# Patient Record
Sex: FEMALE | Race: WHITE | Employment: FULL TIME | URBAN - METROPOLITAN AREA
[De-identification: names, ages, dates, MRNs, and addresses within clinical notes are randomized per-mention and may not be internally consistent; named-entity substitution may affect disease eponyms.]

---

## 2017-02-17 ENCOUNTER — OFFICE VISIT (OUTPATIENT)
Dept: FAMILY MEDICINE CLINIC | Facility: CLINIC | Age: 37
End: 2017-02-17

## 2017-02-17 VITALS
OXYGEN SATURATION: 99 % | HEART RATE: 100 BPM | DIASTOLIC BLOOD PRESSURE: 82 MMHG | SYSTOLIC BLOOD PRESSURE: 136 MMHG | TEMPERATURE: 100 F | BODY MASS INDEX: 40 KG/M2 | WEIGHT: 235 LBS | RESPIRATION RATE: 16 BRPM

## 2017-02-17 DIAGNOSIS — L40.9 PSORIASIS: ICD-10-CM

## 2017-02-17 DIAGNOSIS — R11.0 NAUSEA: ICD-10-CM

## 2017-02-17 DIAGNOSIS — J32.0 LEFT MAXILLARY SINUSITIS: Primary | ICD-10-CM

## 2017-02-17 DIAGNOSIS — Z98.811 DENTAL CROWN PRESENT: ICD-10-CM

## 2017-02-17 DIAGNOSIS — Z20.818 STREP THROAT EXPOSURE: ICD-10-CM

## 2017-02-17 PROCEDURE — 87081 CULTURE SCREEN ONLY: CPT | Performed by: FAMILY MEDICINE

## 2017-02-17 PROCEDURE — 99214 OFFICE O/P EST MOD 30 MIN: CPT | Performed by: FAMILY MEDICINE

## 2017-02-17 PROCEDURE — 87880 STREP A ASSAY W/OPTIC: CPT | Performed by: FAMILY MEDICINE

## 2017-02-17 PROCEDURE — 87147 CULTURE TYPE IMMUNOLOGIC: CPT | Performed by: FAMILY MEDICINE

## 2017-02-17 RX ORDER — ONDANSETRON 8 MG/1
8 TABLET, ORALLY DISINTEGRATING ORAL EVERY 8 HOURS PRN
Qty: 10 TABLET | Refills: 0 | Status: SHIPPED | OUTPATIENT
Start: 2017-02-17 | End: 2017-02-24

## 2017-02-17 RX ORDER — AMOXICILLIN AND CLAVULANATE POTASSIUM 875; 125 MG/1; MG/1
1 TABLET, FILM COATED ORAL 2 TIMES DAILY
Qty: 20 TABLET | Refills: 0 | Status: SHIPPED | OUTPATIENT
Start: 2017-02-17 | End: 2017-02-27

## 2017-02-17 NOTE — PATIENT INSTRUCTIONS
Sinusitis (Antibiotic Treatment)    The sinuses are air-filled spaces within the bones of the face. They connect to the inside of the nose. Sinusitis is an inflammation of the tissue lining the sinus cavity. Sinus inflammation can occur during a cold.  It · Do not use nasal rinses or irrigation during an acute sinus infection, unless told to by your health care provider. Rinsing may spread the infection to other sinuses.   · Use acetaminophen or ibuprofen to control pain, unless another pain medicine was pre Drinking extra fluids helps thin your mucus. This lets it drain from your sinuses more easily. Have a glass of water every hour or two. A humidifier helps in much the same way. Fluids can also offset the drying effects of certain medicines.  If you use a hu A dental abscess is an infection of the tooth socket. It often starts with a crack or cavity in the tooth. A pocket of pus forms between the tooth and the bone. The infection causes pain and swelling of the gum, cheek, or jaw.  The pain is often made worse · You can take acetaminophen or ibuprofen for pain, unless you were given a different pain medicine to use.  (Note: If you have chronic liver or kidney disease, have ever had a stomach ulcer or gastrointestinal bleeding, or are taking blood-thinning medicin

## 2017-02-17 NOTE — PROGRESS NOTES
Patient presents with:  Nasal Congestion: 1 month, exposure to Strep  Ear Pain: 1 week  Stomach Pain: nausea, no vomitting      HPI:   Kyler Hodge is a 40year old female who presents for upper respiratory symptoms for  1  months.  Patient reports nemo 02/01/2017  GENERAL: well developed, well nourished,in no apparent distress  SKIN: no acute rashes,no suspicious lesions, moderate psoriasis bilateral forearms large plaques posterior auricular area at scalp line  EYES:PERRLA, EOMI, normal optic disk,conju

## 2017-02-20 ENCOUNTER — TELEPHONE (OUTPATIENT)
Dept: FAMILY MEDICINE CLINIC | Facility: CLINIC | Age: 37
End: 2017-02-20

## 2017-02-20 NOTE — TELEPHONE ENCOUNTER
Spoke with pt, reviewed results and recommendations. Pt has improvement in symptoms on antibiotics    Notes Recorded by Ty Nicholson DO on 2/19/2017 at 9:01 PM  Not strep A. Strep B not treatable cause. Continue augmentin for sinusitis. Please inform.

## 2018-02-04 ENCOUNTER — OFFICE VISIT (OUTPATIENT)
Dept: FAMILY MEDICINE CLINIC | Facility: CLINIC | Age: 38
End: 2018-02-04

## 2018-02-04 VITALS
BODY MASS INDEX: 39.3 KG/M2 | TEMPERATURE: 98 F | SYSTOLIC BLOOD PRESSURE: 128 MMHG | RESPIRATION RATE: 20 BRPM | HEIGHT: 64 IN | WEIGHT: 230.19 LBS | OXYGEN SATURATION: 98 % | HEART RATE: 126 BPM | DIASTOLIC BLOOD PRESSURE: 84 MMHG

## 2018-02-04 DIAGNOSIS — H10.32 ACUTE BACTERIAL CONJUNCTIVITIS OF LEFT EYE: Primary | ICD-10-CM

## 2018-02-04 PROCEDURE — 99213 OFFICE O/P EST LOW 20 MIN: CPT | Performed by: NURSE PRACTITIONER

## 2018-02-04 RX ORDER — IBUPROFEN 400 MG/1
400 TABLET ORAL DAILY
COMMUNITY

## 2018-02-04 RX ORDER — OFLOXACIN 3 MG/ML
2 SOLUTION/ DROPS OPHTHALMIC 3 TIMES DAILY
Qty: 5 ML | Refills: 0 | Status: SHIPPED | OUTPATIENT
Start: 2018-02-04 | End: 2018-02-09

## 2018-02-04 NOTE — PATIENT INSTRUCTIONS
· Avoid touching eyes. · Good handwashing as conjunctivitis is very contagious. · Warm compresses to affected eye as needed. · Change pillowcase out tonight and tomorrow. · If you wear contacts, please toss them.  No contact use until eyes are no vani

## 2018-02-04 NOTE — PROGRESS NOTES
CHIEF COMPLAINT:   Patient presents with:  Conjunctivitis      HPI:   Poppy Carvajal is a 41year old female who presents with chief complaint of \"pink eye\". Symptoms began  1  days ago. Symptoms have been worsening since onset.    Patient reports left murmur  LYMPH: no preauricular lymphadenopathy.  No cervical lymphadenopathy    ASSESSMENT AND PLAN:   Nathaly Bundy is a 41year old female who presents with:    ASSESSMENT:   Acute bacterial conjunctivitis of left eye  (primary encounter diagnosis)

## 2018-04-25 ENCOUNTER — OFFICE VISIT (OUTPATIENT)
Dept: FAMILY MEDICINE CLINIC | Facility: CLINIC | Age: 38
End: 2018-04-25

## 2018-04-25 VITALS
SYSTOLIC BLOOD PRESSURE: 136 MMHG | RESPIRATION RATE: 16 BRPM | HEART RATE: 100 BPM | TEMPERATURE: 99 F | DIASTOLIC BLOOD PRESSURE: 80 MMHG | OXYGEN SATURATION: 99 %

## 2018-04-25 DIAGNOSIS — J01.00 ACUTE NON-RECURRENT MAXILLARY SINUSITIS: Primary | ICD-10-CM

## 2018-04-25 DIAGNOSIS — J40 BRONCHITIS: ICD-10-CM

## 2018-04-25 PROCEDURE — 99213 OFFICE O/P EST LOW 20 MIN: CPT | Performed by: PHYSICIAN ASSISTANT

## 2018-04-25 RX ORDER — FLUTICASONE PROPIONATE 50 MCG
2 SPRAY, SUSPENSION (ML) NASAL DAILY
Qty: 1 INHALER | Refills: 0 | Status: SHIPPED | OUTPATIENT
Start: 2018-04-25

## 2018-04-25 RX ORDER — BENZONATATE 200 MG/1
200 CAPSULE ORAL 3 TIMES DAILY PRN
Qty: 30 CAPSULE | Refills: 0 | Status: SHIPPED | OUTPATIENT
Start: 2018-04-25 | End: 2019-02-06 | Stop reason: ALTCHOICE

## 2018-04-25 RX ORDER — AMOXICILLIN AND CLAVULANATE POTASSIUM 875; 125 MG/1; MG/1
1 TABLET, FILM COATED ORAL 2 TIMES DAILY
Qty: 20 TABLET | Refills: 0 | Status: SHIPPED | OUTPATIENT
Start: 2018-04-25 | End: 2018-05-05

## 2018-04-25 RX ORDER — ALBUTEROL SULFATE 90 UG/1
2 AEROSOL, METERED RESPIRATORY (INHALATION) EVERY 4 HOURS PRN
Qty: 1 INHALER | Refills: 0 | Status: SHIPPED | OUTPATIENT
Start: 2018-04-25 | End: 2019-02-06 | Stop reason: ALTCHOICE

## 2018-04-25 NOTE — PATIENT INSTRUCTIONS
What is Acute Bronchitis? Acute or short-term bronchitis last for days or weeks. It occurs when the bronchial tubes (airways in the lungs) are irritated by a virus, bacteria, or allergen. This causes a cough that produces yellow or greenish mucus.   In · Don’t smoke, and avoid secondhand smoke. · Use a humidifier, or breathe in steam from a hot shower. This may help loosen mucus. · Drink a lot of water and juice. They can soothe the throat and may help thin mucus.   · Sit up or use extra pillows when in Acute or short-term bronchitis last for days or weeks. It occurs when the bronchial tubes (airways in the lungs) are irritated by a virus, bacteria, or allergen. This causes a cough that produces yellow or greenish mucus.   Inside healthy lungs  Air travels · Use a humidifier, or breathe in steam from a hot shower. This may help loosen mucus. · Drink a lot of water and juice. They can soothe the throat and may help thin mucus. · Sit up or use extra pillows when in bed to help lessen coughing and congestion. Acute bacterial rhinosinusitis (ABRS) is an infection of your nasal cavity and sinuses. It’s caused by bacteria. Acute means that you’ve had symptoms for less than 12 weeks.   Understanding your sinuses  The nasal cavity is the large air-filled space behind · Nasal decongestant medicine. Spray or drops may help to lessen congestion. Do not use them for more than a few days. · Salt wash (saline irrigation). This can help to loosen mucus.   Possible complications of ABRS  ABRS may come back or become long-term

## 2018-04-25 NOTE — PROGRESS NOTES
CHIEF COMPLAINT:   Patient presents with:  URI: cough, wheezy, headache, ears  hurt > right. over a week duration. HPI:   Georgie Junior is a 41year old female who presents for cold symptoms for  7  days.  Symptoms have progressed into sinus conges tobacco: Never Used                      Alcohol use: Yes              Comment: rarely        REVIEW OF SYSTEMS:   GENERAL:  intact appetite  SKIN: no rashes or abnormal skin lesions  HEENT: See HPI.     LUNGS: denies shortness of breath or wheezing, See HP capsule (200 mg total) by mouth 3 (three) times daily as needed for cough. Fluticasone Propionate 50 MCG/ACT Nasal Suspension 1 Inhaler 0      Si sprays by Each Nare route daily.       Amoxicillin-Pot Clavulanate 875-125 MG Oral Tab 20 tablet 0

## 2019-02-06 ENCOUNTER — OFFICE VISIT (OUTPATIENT)
Dept: FAMILY MEDICINE CLINIC | Facility: CLINIC | Age: 39
End: 2019-02-06
Payer: COMMERCIAL

## 2019-02-06 VITALS
BODY MASS INDEX: 38 KG/M2 | HEART RATE: 94 BPM | SYSTOLIC BLOOD PRESSURE: 134 MMHG | OXYGEN SATURATION: 98 % | TEMPERATURE: 98 F | DIASTOLIC BLOOD PRESSURE: 78 MMHG | WEIGHT: 222 LBS

## 2019-02-06 DIAGNOSIS — H69.83 EUSTACHIAN TUBE DYSFUNCTION, BILATERAL: Primary | ICD-10-CM

## 2019-02-06 DIAGNOSIS — B35.3 TINEA PEDIS OF RIGHT FOOT: ICD-10-CM

## 2019-02-06 PROCEDURE — 99214 OFFICE O/P EST MOD 30 MIN: CPT | Performed by: FAMILY MEDICINE

## 2019-02-06 RX ORDER — CLOTRIMAZOLE 1 %
1 CREAM (GRAM) TOPICAL 2 TIMES DAILY
Qty: 1 TUBE | Refills: 0 | Status: SHIPPED | OUTPATIENT
Start: 2019-02-06

## 2019-02-06 NOTE — PROGRESS NOTES
CHIEF COMPLAINT: Patient presents with: Infection: Rt foot; ongoing for few weeks   Ear Problem: Right         HPI:     Anupama Ravi is a 46year old female presents for ear pain and foot infection.     Right ear pain: Pt c/o pressure and pain in her e from today and agreed except as otherwise stated in HPI.  ROS:     Review of Systems   Constitutional: Negative for chills, fatigue and fever. HENT: Positive for congestion and ear pain.  Negative for postnasal drip, rhinorrhea, sinus pressure and sore th [id number]    • Kit Expiration Date 02/17/2017 4/18    • Strep A Culture 02/17/2017 Moderate Beta hemolytic Streptococcus, NOT Group A*      REVIEWED THIS VISIT  ASSESSMENT/PLAN:   46year old female with    1.  Eustachian tube dysfunction, bilateral  Recom

## 2019-02-06 NOTE — PATIENT INSTRUCTIONS
For excess ear fluid  - Take daily antihistamine (Claritin, Zyrtec, or Allegra, generics ok too)    Athlete's Foot  - fungal cream, keep foot dry, change of socks during the day      As of October 6th 2014, the Drug Enforcement Agency (EDIN) is reclassifyin name of the person picking up your prescription must be documented in your chart. Athlete’s Foot    Athlete’s foot (tinea pedis) is caused by a fungal infection in the skin.  It affects the skin between the toes, causing cracks in the skin called fissure occur:  · Fever of 100.4°F (38°C) or higher, or as directed  · Increasing redness or swelling of the foot  · Infection comes back soon after treatment  · Pus draining from cracks in the skin  Date Last Reviewed: 8/1/2016  © 0976-2568 [employer],

## (undated) NOTE — LETTER
Date: 2/4/2018    Patient Name: Jose Ramon Shaffer          To Whom it may concern: The above patient was seen at the [facility] for treatment of a medical condition. This patient should be excused from attending work 2/5/18.     The patie

## (undated) NOTE — MR AVS SNAPSHOT
[facility] [place]  [address] [id number] [phone]               Thank you for choosing us for your health care visit with Getachew Bell DO. We are glad to serve you and happy to provide you with this summary of your visit.   Pl promote drainage from the sinuses. · Over-the-counter decongestants may be used unless a similar medicine was prescribed. Nasal sprays work the fastest. Use one that contains phenylephrine or oxymetazoline. First blow the nose gently. Then use the spray. 69923. All rights reserved. This information is not intended as a substitute for professional medical care. Always follow your healthcare professional's instructions. Self-Care for Sinusitis     Drinking plenty of water can help sinuses drain.    Sin 61197. All rights reserved. This information is not intended as a substitute for professional medical care. Always follow your healthcare professional's instructions. Dental Abscess    A dental abscess is an infection of the tooth socket.  It often s · You can take acetaminophen or ibuprofen for pain, unless you were given a different pain medicine to use.  (Note: If you have chronic liver or kidney disease, have ever had a stomach ulcer or gastrointestinal bleeding, or are taking blood-thinning medicin Return in about 1 week (around 2/24/2017), or if symptoms worsen or fail to improve.       Allergies as of Feb 17, 2017     No Known Allergies                Today's Vital Signs     BP Pulse Temp Weight          136/82 mmHg 100 99.5 °F (37.5 °C) 235 lb visit,  view other health information, and more. To sign up or find more information, go to [email or web address] and click on the Sign Up Now link in the [employer] box.      Enter your [employer] Activation Code exactly as it appears below along with yo Don’t forget strength training with weights and resistance Set goals and track your progress   You don’t need to join a gym. Home exercises work great.  Put more priority on exercise in your life                    Visit [facility] online at

## (undated) NOTE — LETTER
Date: 4/25/2018    Patient Name: Sharda Quinones          To Whom it may concern: This letter has been written at the patient's request. The above patient was seen at the [facility] for treatment of a medical condition.     This patient sh